# Patient Record
(demographics unavailable — no encounter records)

---

## 2024-11-05 NOTE — HISTORY OF PRESENT ILLNESS
[Just Completed?] : just completed [Gen Ed: _____] : General Education class [unfilled] [IEP] : Individualized Education Program [RR: _____] : Resource room [unfilled] [Counseling: _____] : Counseling [unfilled] [TA: Time: _____] : Extra time for tests [unfilled] [TWNoteComboBox1] : 8th Grade [No Major Concerns] : No major concerns [de-identified] : stable [de-identified] : stable [Major Illness] : no major illness [Major Injury] : no major injury [Surgery] : no surgery [Hospitalizations] : no hospitalizations [New Medications] : no new medication [New Allergies] : no new allergies [Decreased Appetite] : decreased appetite

## 2024-11-05 NOTE — PLAN
[Continue present medication regimen _____] : - Continue present medication regimen [unfilled] [Careful Teacher Selection] : - Next year's teacher(s) should be carefully selected to ensure a favorable fit [Continue IEP] : - Continue services as presently provided for in the Individualized Education Program [SETSS Services] : - Provision of  support services (SETSS) is recommended [More Classroom Support] : - In the classroom, [unfilled] will need more support, guidance, positive reinforcement and feedback than many of ~his/her~ classmates.  Accordingly, ~he/she~ will need to be in a classroom with a low student to teacher ratio.  Placement in an inclusion/collaborative teaching classroom would achieve this goal [Resource Room] : - Provision of special education services in a resource room is recommended [ADHD EDU/Behav. Strategies (Gen)] : - Those educational and behavioral strategies known to be helpful to children with ADHD should be implemented in the classroom. [Instruction in Executive Function Skills] : - Direct, individualized instruction in executive function-related skills: i.e. task analysis, planning, organization, study strategies, memorization [Specific Services: ____] : - The following specific services / therapies are recommended: [unfilled] [AIS for: _____] : - Academic Intervention Services for [unfilled] [Intensive Reading Instruction] : - Intensive reading instruction [Speech/Language] : - Speech and language therapy  [Individual In-School Counseling] : - Individual counseling weekly with school psychologist or  [Social Skills] : - Social skills training [Endocrine] : - Pediatric Endocrinologist [Fish Oil] : - Dietary supplementation with fish oil as a source of omega-3 fatty acids - guidelines and cautions discussed [Follow-up visit (med treatment monitoring): ____] : - Follow-up visit in [unfilled]  to evaluate response to medication and monitoring of medication treatment [CAPS] : - CAPS form completed 1-2 days before the visit. [IEP or IFSP] : - Copy of most recent Individualized Education Program (IEP) or Family Service Plan (IFSP) [Test reports] : - Reports of most recent psychological, educational, speech/language, PT, OT test results [FreeTextEntry3] :  Concerta 54 mg during school and 36 mg during holidays [FreeTextEntry8] : saffron [Accuracy] : Accuracy and reliability of clinical impressions [Clinical Basis] : Clinical basis for current diagnosis and clinical impressions [Goals / Benefits] : Goals & potential benefits of treatment with medication, as well as the limitations of pharmacotherapy [Stimulants] : Potential benefits and limitations of treatment with stimulant medication.  Potential adverse events were also reviewed, including insomnia, reduced appetite, change in blood pressure or heart rate, headache, stomachache, slowing of growth, moodiness, and onset of tics [CAM Therapies] : Benefits and limits of CAM therapies [Family Questions] : Family's questions were addressed [Diet] : Evidence-based clinical information about diet [Sleep] : The importance of sleep and strategies to ensure adequate sleep [Media / Screen Time] : Importance of limiting electronics, media, and screen time [Exercise] : Regular exercise

## 2024-11-05 NOTE — REASON FOR VISIT
[Follow-Up Visit] : a follow-up visit for [ADHD] : ADHD [Patient] : patient [Mother] : mother [FreeTextEntry2] : Cornell is seeing endocrinologist for short stature.but on hold due to insurance issues. Cronell has reduced appetite and makes up in evening.Cornell is in wrestling.Cornell is on concerta 34mg in am and doing well in summer. cornell will take concerta 54 mg in am for school year. Cornell is eating and sleeping well and takes madication only when he needs in summer.. Sleep, appetite and mood stable. Cornell has grown and gained weight. [TextEntry] : Telemedicine audiovisual 2 way follow up visit with consent.Mother and child in [facility]. DR Leary in [facility].

## 2025-01-29 NOTE — HISTORY OF PRESENT ILLNESS
[Just Completed?] : just completed [Gen Ed: _____] : General Education class [unfilled] [IEP] : Individualized Education Program [RR: _____] : Resource room [unfilled] [Counseling: _____] : Counseling [unfilled] [TA: Time: _____] : Extra time for tests [unfilled] [TWNoteComboBox1] : 8th Grade [No Major Concerns] : No major concerns [de-identified] : stable [de-identified] : stable [Major Illness] : no major illness [Major Injury] : no major injury [Surgery] : no surgery [Hospitalizations] : no hospitalizations [New Medications] : no new medication [New Allergies] : no new allergies [Decreased Appetite] : decreased appetite

## 2025-01-29 NOTE — PHYSICAL EXAM
[Normal] : patient has a normal gait [Attention Intact] : attention intact [Fidgets] : does not fidget [Well-behaved during visit] : well-behaved during visit [Appropriate eye contact] : appropriate eye contact [Smiles responsively] : smiles responsively [Positive mood] : positive mood [Answered questions appropriately] : answered questions appropriately [Responds to name] : responds to name [Echolalia] : no echolalia [Social referencing noted] : social referencing noted [de-identified] : Max makes appropriate back and forth fluent conversation,

## 2025-01-29 NOTE — REASON FOR VISIT
[Follow-Up Visit] : a follow-up visit for [ADHD] : ADHD [Patient] : patient [Mother] : mother [FreeTextEntry2] : Cornell is seeing endocrinologist for short stature.but on hold due to insurance issues. Cornell has reduced appetite and makes up in evening.Cornell is in wrestling.Cornell is on concerta 34mg in am and doing well in summer. cornell will take concerta 54 mg in am for school year. Cornell is eating and sleeping well and takes madication only when he needs in summer.. Sleep, appetite and mood stable. Cornell has grown and gained weight. [TextEntry] : Telemedicine audiovisual 2 way follow up visit with consent.Mother and child in [facility]. DR Leary in [facility].

## 2025-01-29 NOTE — END OF VISIT
[Time Spent: ___ minutes] : I have spent [unfilled] minutes of time on the encounter which excludes teaching and separately reported services.
Awake

## 2025-04-25 NOTE — HISTORY OF PRESENT ILLNESS
[Just Completed?] : just completed [Gen Ed: _____] : General Education class [unfilled] [IEP] : Individualized Education Program [RR: _____] : Resource room [unfilled] [Counseling: _____] : Counseling [unfilled] [TA: Time: _____] : Extra time for tests [unfilled] [TWNoteComboBox1] : 8th Grade [No Major Concerns] : No major concerns [de-identified] : stable [Major Illness] : no major illness [de-identified] : stable [Major Injury] : no major injury [Surgery] : no surgery [Hospitalizations] : no hospitalizations [New Medications] : no new medication [New Allergies] : no new allergies [Decreased Appetite] : decreased appetite [FreeTextEntry6] : weight stable but no weight gain

## 2025-04-25 NOTE — REASON FOR VISIT
[Follow-Up Visit] : a follow-up visit for [ADHD] : ADHD [Patient] : patient [Mother] : mother [FreeTextEntry2] : Cornell is seeing endocrinologist for short stature.but on hold due to insurance issues. Cornell has reduced appetite and makes up in evening.Cornell is in wrestling.Cornell is on concerta 34mg in am and doing well in summer. cornell will take concerta 54 mg in am for school year. Cornell is eating and sleeping well off medication but has severe appetite suppression. Sleep, appetite and mood stable. Cornell has no weight loss but no weight gain. [TextEntry] : Telemedicine audiovisual 2 way follow up visit with consent.Mother and child in [facility]. DR Leary in [facility].

## 2025-04-25 NOTE — PLAN
[Continue present medication regimen _____] : - Continue present medication regimen [unfilled] [Careful Teacher Selection] : - Next year's teacher(s) should be carefully selected to ensure a favorable fit [Continue IEP] : - Continue services as presently provided for in the Individualized Education Program [SETSS Services] : - Provision of  support services (SETSS) is recommended [More Classroom Support] : - In the classroom, [unfilled] will need more support, guidance, positive reinforcement and feedback than many of ~his/her~ classmates.  Accordingly, ~he/she~ will need to be in a classroom with a low student to teacher ratio.  Placement in an inclusion/collaborative teaching classroom would achieve this goal [Resource Room] : - Provision of special education services in a resource room is recommended [ADHD EDU/Behav. Strategies (Gen)] : - Those educational and behavioral strategies known to be helpful to children with ADHD should be implemented in the classroom. [Instruction in Executive Function Skills] : - Direct, individualized instruction in executive function-related skills: i.e. task analysis, planning, organization, study strategies, memorization [Specific Services: ____] : - The following specific services / therapies are recommended: [unfilled] [AIS for: _____] : - Academic Intervention Services for [unfilled] [Intensive Reading Instruction] : - Intensive reading instruction [Speech/Language] : - Speech and language therapy  [Individual In-School Counseling] : - Individual counseling weekly with school psychologist or  [Social Skills] : - Social skills training [Endocrine] : - Pediatric Endocrinologist [Fish Oil] : - Dietary supplementation with fish oil as a source of omega-3 fatty acids - guidelines and cautions discussed [Follow-up visit (med treatment monitoring): ____] : - Follow-up visit in [unfilled]  to evaluate response to medication and monitoring of medication treatment [CAPS] : - CAPS form completed 1-2 days before the visit. [IEP or IFSP] : - Copy of most recent Individualized Education Program (IEP) or Family Service Plan (IFSP) [Test reports] : - Reports of most recent psychological, educational, speech/language, PT, OT test results [FreeTextEntry3] :  Concerta 54 mg during school and 36 mg during holidays or off medication for appetite and weight gain [FreeTextEntry8] : saffron [Accuracy] : Accuracy and reliability of clinical impressions [Clinical Basis] : Clinical basis for current diagnosis and clinical impressions [Goals / Benefits] : Goals & potential benefits of treatment with medication, as well as the limitations of pharmacotherapy [Stimulants] : Potential benefits and limitations of treatment with stimulant medication.  Potential adverse events were also reviewed, including insomnia, reduced appetite, change in blood pressure or heart rate, headache, stomachache, slowing of growth, moodiness, and onset of tics [CAM Therapies] : Benefits and limits of CAM therapies [Family Questions] : Family's questions were addressed [Diet] : Evidence-based clinical information about diet [Sleep] : The importance of sleep and strategies to ensure adequate sleep [Media / Screen Time] : Importance of limiting electronics, media, and screen time [Exercise] : Regular exercise

## 2025-04-25 NOTE — PHYSICAL EXAM
[Normal] : patient has a normal gait [Attention Intact] : attention intact [Fidgets] : does not fidget [Well-behaved during visit] : well-behaved during visit [Appropriate eye contact] : appropriate eye contact [Smiles responsively] : smiles responsively [Positive mood] : positive mood [Answered questions appropriately] : answered questions appropriately [Responds to name] : responds to name [Echolalia] : no echolalia [Social referencing noted] : social referencing noted [de-identified] : Max makes appropriate back and forth fluent conversation,